# Patient Record
Sex: FEMALE | Race: BLACK OR AFRICAN AMERICAN | NOT HISPANIC OR LATINO | Employment: FULL TIME | URBAN - METROPOLITAN AREA
[De-identification: names, ages, dates, MRNs, and addresses within clinical notes are randomized per-mention and may not be internally consistent; named-entity substitution may affect disease eponyms.]

---

## 2023-08-16 PROBLEM — B37.31 CANDIDA VAGINITIS: Status: RESOLVED | Noted: 2023-08-16 | Resolved: 2023-08-16

## 2023-08-16 PROBLEM — L25.9 CONTACT DERMATITIS: Status: RESOLVED | Noted: 2023-08-16 | Resolved: 2023-08-16

## 2023-08-16 PROBLEM — L43.9 LICHEN PLANUS: Status: ACTIVE | Noted: 2023-08-16

## 2023-08-16 PROBLEM — E55.9 VITAMIN D DEFICIENCY: Status: ACTIVE | Noted: 2023-08-16

## 2023-08-16 PROBLEM — F70 MILD INTELLECTUAL DISABILITY: Status: ACTIVE | Noted: 2023-08-16

## 2023-08-16 PROBLEM — E87.6 HYPOKALEMIA: Status: ACTIVE | Noted: 2023-08-16

## 2023-08-16 PROBLEM — E66.9 OBESITY: Status: RESOLVED | Noted: 2023-08-16 | Resolved: 2023-08-16

## 2023-08-16 PROBLEM — O21.9 NAUSEA AND VOMITING IN PREGNANCY (HHS-HCC): Status: RESOLVED | Noted: 2023-08-16 | Resolved: 2023-08-16

## 2023-08-16 PROBLEM — O23.02 PYELONEPHRITIS AFFECTING PREGNANCY IN SECOND TRIMESTER (HHS-HCC): Status: RESOLVED | Noted: 2023-08-16 | Resolved: 2023-08-16

## 2023-08-16 PROBLEM — R63.5 ABNORMAL WEIGHT GAIN: Status: RESOLVED | Noted: 2023-08-16 | Resolved: 2023-08-16

## 2023-08-16 PROBLEM — L70.9 ACNE: Status: ACTIVE | Noted: 2023-08-16

## 2023-08-16 RX ORDER — B-COMPLEX WITH VITAMIN C
1 TABLET ORAL DAILY
COMMUNITY
Start: 2022-03-02 | End: 2024-01-12 | Stop reason: ALTCHOICE

## 2023-08-16 RX ORDER — POTASSIUM CHLORIDE 1500 MG/1
1 TABLET, EXTENDED RELEASE ORAL DAILY
COMMUNITY
Start: 2022-07-11

## 2023-08-16 RX ORDER — ERGOCALCIFEROL 1.25 MG/1
1 CAPSULE ORAL WEEKLY
COMMUNITY
Start: 2021-10-22 | End: 2023-08-19 | Stop reason: SDUPTHER

## 2023-08-17 ENCOUNTER — OFFICE VISIT (OUTPATIENT)
Dept: PRIMARY CARE | Facility: CLINIC | Age: 23
End: 2023-08-17
Payer: COMMERCIAL

## 2023-08-17 VITALS
BODY MASS INDEX: 32.58 KG/M2 | HEIGHT: 68 IN | SYSTOLIC BLOOD PRESSURE: 106 MMHG | WEIGHT: 215 LBS | HEART RATE: 72 BPM | DIASTOLIC BLOOD PRESSURE: 72 MMHG

## 2023-08-17 DIAGNOSIS — F70 MILD INTELLECTUAL DISABILITY: ICD-10-CM

## 2023-08-17 DIAGNOSIS — E55.9 VITAMIN D DEFICIENCY: ICD-10-CM

## 2023-08-17 DIAGNOSIS — B35.1 ONYCHOMYCOSIS: Primary | ICD-10-CM

## 2023-08-17 PROCEDURE — 1036F TOBACCO NON-USER: CPT | Performed by: FAMILY MEDICINE

## 2023-08-17 PROCEDURE — 99214 OFFICE O/P EST MOD 30 MIN: CPT | Performed by: FAMILY MEDICINE

## 2023-08-17 RX ORDER — TERBINAFINE HYDROCHLORIDE 250 MG/1
250 TABLET ORAL DAILY
Qty: 90 TABLET | Refills: 0 | Status: SHIPPED | OUTPATIENT
Start: 2023-08-17 | End: 2023-11-09 | Stop reason: SDUPTHER

## 2023-08-17 ASSESSMENT — PATIENT HEALTH QUESTIONNAIRE - PHQ9
1. LITTLE INTEREST OR PLEASURE IN DOING THINGS: NOT AT ALL
2. FEELING DOWN, DEPRESSED OR HOPELESS: NOT AT ALL
SUM OF ALL RESPONSES TO PHQ9 QUESTIONS 1 AND 2: 0

## 2023-08-17 ASSESSMENT — ENCOUNTER SYMPTOMS
LOSS OF SENSATION IN FEET: 0
DEPRESSION: 0
OCCASIONAL FEELINGS OF UNSTEADINESS: 0

## 2023-08-19 PROBLEM — B35.1 ONYCHOMYCOSIS: Status: ACTIVE | Noted: 2023-08-19

## 2023-08-19 RX ORDER — ERGOCALCIFEROL 1.25 MG/1
1 CAPSULE ORAL
Qty: 12 CAPSULE | Refills: 3 | Status: SHIPPED | OUTPATIENT
Start: 2023-08-19 | End: 2023-11-09 | Stop reason: SDUPTHER

## 2023-08-19 NOTE — PROGRESS NOTES
"Assessment and Plan:  Problem List Items Addressed This Visit       Mild intellectual disability    Vitamin D deficiency    Relevant Medications    ergocalciferol (Vitamin D-2) 1.25 MG (27849 UT) capsule    Onychomycosis - Primary    Relevant Medications    terbinafine (LamISIL) 250 mg tablet         HPI: has toe nail fungus on all toes for 2 years agetting worse   Needs RF vit D        ROS   Constitutional:  o weight loss. Negative for chills and fever.   HENT: Negative.     Respiratory: Negative.     Cardiovascular: Negative.    Gastrointestinal: Negative.  Negative for nausea and vomiting.   Endocrine: Negative.    Genitourinary: Negative.    Musculoskeletal: Negative.    Skin: Negative.  Negative for rash.   Allergic/Immunologic: Negative.    Neurological: Negative.    Hematological: Negative.    Psychiatric/Behavioral: Negative.     All other systems reviewed and are negative.      Blood Pressure 106/72   Pulse 72   Height 1.727 m (5' 8\")   Weight 97.5 kg (215 lb)   Body Mass Index 32.69 kg/m²   Body mass index is 32.69 kg/m².  Physical Exam    ENT:      Head: Normocephalic and atraumatic.      Right Ear: Tympanic membrane normal.      Left Ear: Tympanic membrane normal.      Nose: Nose normal.      Mouth/Throat:      Mouth: Mucous membranes are moist.   Eyes:      Pupils: Pupils are equal, round, and reactive to light.   Cardiovascular:      Rate and Rhythm: Normal rate and regular rhythm.      Pulses: Normal pulses.      Heart sounds: Normal heart sounds.   Pulmonary:      Effort: Pulmonary effort is normal.      Breath sounds: Normal breath sounds.   Abdominal:      General: Abdomen is flat. Bowel sounds are normal.      Palpations: Abdomen is soft.   Musculoskeletal:         General: Normal range of motion.      Cervical back: Normal range of motion and neck supple.   Skin:     General: Skin is warm and dry.      Capillary Refill: Capillary refill takes less than 2 seconds.   Neurological:      General: " No focal deficit present.      Mental Status: She is alert and oriented to person, place, and time.   Psychiatric:         Mood and Affect: Mood normal.       Active Problem List  Patient Active Problem List   Diagnosis    Acne    Hypokalemia    Lichen planus    Mild intellectual disability    Vitamin D deficiency    Onychomycosis       Comprehensive Medical/Surgical/Social/Family History  Past Medical History:   Diagnosis Date    Abnormal weight gain 08/16/2023    Contact dermatitis 08/16/2023    Encounter for contraceptive management, unspecified 01/20/2020    Contraception management    Encounter for other general counseling and advice on contraception 11/18/2017    Counseling for birth control, oral contraceptives    Obesity 08/16/2023    Pyelonephritis affecting pregnancy in second trimester 08/16/2023     No past surgical history on file.  Social History     Social History Narrative    Not on file       Allergies and Medications  Patient has no known allergies.  Current Outpatient Medications   Medication Instructions    ergocalciferol (VITAMIN D-2) 1,250 mcg, oral, Weekly    potassium chloride CR (K-Tab) 20 mEq ER tablet 1 tablet, oral, Daily    prenatal vitamin iron-folic (Prenatal Vitamin) 27 mg iron- 800 mcg tablet 1 tablet, oral, Daily    terbinafine (LAMISIL) 250 mg, oral, Daily

## 2023-08-29 RX ORDER — IBUPROFEN 600 MG/1
TABLET ORAL
COMMUNITY
Start: 2022-09-25

## 2023-08-29 RX ORDER — PRENATAL WITH FERROUS FUM AND FOLIC ACID 3080; 920; 120; 400; 22; 1.84; 3; 20; 10; 1; 12; 200; 27; 25; 2 [IU]/1; [IU]/1; MG/1; [IU]/1; MG/1; MG/1; MG/1; MG/1; MG/1; MG/1; UG/1; MG/1; MG/1; MG/1; MG/1
1 TABLET ORAL
COMMUNITY
Start: 2022-12-06 | End: 2024-01-12 | Stop reason: ALTCHOICE

## 2023-10-05 ENCOUNTER — APPOINTMENT (OUTPATIENT)
Dept: OBSTETRICS AND GYNECOLOGY | Facility: CLINIC | Age: 23
End: 2023-10-05
Payer: COMMERCIAL

## 2023-10-27 ENCOUNTER — APPOINTMENT (OUTPATIENT)
Dept: OBSTETRICS AND GYNECOLOGY | Facility: CLINIC | Age: 23
End: 2023-10-27
Payer: COMMERCIAL

## 2023-10-27 RX ORDER — MEDROXYPROGESTERONE ACETATE 150 MG/ML
150 INJECTION, SUSPENSION INTRAMUSCULAR ONCE
Status: CANCELLED | OUTPATIENT
Start: 2023-10-27 | End: 2023-10-27

## 2023-10-27 RX ORDER — AMOXICILLIN 500 MG/1
500 CAPSULE ORAL 3 TIMES DAILY
COMMUNITY
Start: 2023-09-21 | End: 2023-11-09 | Stop reason: ALTCHOICE

## 2023-11-09 ENCOUNTER — OFFICE VISIT (OUTPATIENT)
Dept: PRIMARY CARE | Facility: CLINIC | Age: 23
End: 2023-11-09
Payer: COMMERCIAL

## 2023-11-09 VITALS
HEIGHT: 68 IN | DIASTOLIC BLOOD PRESSURE: 72 MMHG | BODY MASS INDEX: 33.04 KG/M2 | SYSTOLIC BLOOD PRESSURE: 110 MMHG | WEIGHT: 218 LBS | HEART RATE: 91 BPM

## 2023-11-09 DIAGNOSIS — B35.1 ONYCHOMYCOSIS: ICD-10-CM

## 2023-11-09 DIAGNOSIS — E55.9 VITAMIN D DEFICIENCY: ICD-10-CM

## 2023-11-09 LAB
ALBUMIN SERPL BCP-MCNC: 4.5 G/DL (ref 3.4–5)
ALP SERPL-CCNC: 74 U/L (ref 33–110)
ALT SERPL W P-5'-P-CCNC: 16 U/L (ref 7–45)
AST SERPL W P-5'-P-CCNC: 16 U/L (ref 9–39)
BILIRUB DIRECT SERPL-MCNC: 0.1 MG/DL (ref 0–0.3)
BILIRUB SERPL-MCNC: 0.4 MG/DL (ref 0–1.2)
PROT SERPL-MCNC: 7.7 G/DL (ref 6.4–8.2)

## 2023-11-09 PROCEDURE — 80076 HEPATIC FUNCTION PANEL: CPT

## 2023-11-09 PROCEDURE — 36415 COLL VENOUS BLD VENIPUNCTURE: CPT

## 2023-11-09 PROCEDURE — 99214 OFFICE O/P EST MOD 30 MIN: CPT | Performed by: FAMILY MEDICINE

## 2023-11-09 PROCEDURE — 1036F TOBACCO NON-USER: CPT | Performed by: FAMILY MEDICINE

## 2023-11-09 RX ORDER — ERGOCALCIFEROL 1.25 MG/1
1 CAPSULE ORAL
Qty: 12 CAPSULE | Refills: 3 | Status: SHIPPED | OUTPATIENT
Start: 2023-11-09 | End: 2024-01-15 | Stop reason: SDUPTHER

## 2023-11-09 RX ORDER — TERBINAFINE HYDROCHLORIDE 250 MG/1
250 TABLET ORAL DAILY
Qty: 90 TABLET | Refills: 0 | Status: SHIPPED | OUTPATIENT
Start: 2023-11-09 | End: 2024-01-12 | Stop reason: SDUPTHER

## 2023-11-09 NOTE — PROGRESS NOTES
Chief Complaint/HPI:  Patient for follow-up needs to refill vitamin D and terbinafine toenail fungus is getting better denies any other complaints      ROS otherwise negative aside from what was mentioned above in HPI.      Patient Active Problem List   Diagnosis    Acne    Hypokalemia    Lichen planus    Mild intellectual disability    Vitamin D deficiency    Onychomycosis     Past Medical History:   Diagnosis Date    Abnormal weight gain 08/16/2023    Contact dermatitis 08/16/2023    Encounter for contraceptive management, unspecified 01/20/2020    Contraception management    Encounter for other general counseling and advice on contraception 11/18/2017    Counseling for birth control, oral contraceptives    Obesity 08/16/2023    Pyelonephritis affecting pregnancy in second trimester 08/16/2023     History reviewed. No pertinent surgical history.  Family History   Problem Relation Name Age of Onset    No Known Problems Mother      No Known Problems Father       Social History     Tobacco Use    Smoking status: Never    Smokeless tobacco: Never   Substance Use Topics    Alcohol use: Never    Drug use: Never         ALLERGIES  No Known Allergies      MEDICATIONS  Current Outpatient Medications   Medication Sig Dispense Refill    ibuprofen 600 mg tablet       potassium chloride CR (K-Tab) 20 mEq ER tablet Take 1 tablet (20 mEq) by mouth once daily.      prenatal vitamin iron-folic (Prenatal Vitamin) 27 mg iron- 800 mcg tablet Take 1 tablet by mouth once daily.      Prenatal Vitamin Plus Low Iron 27 mg iron- 1 mg tablet Take 1 tablet by mouth once daily with a meal.      ergocalciferol (Vitamin D-2) 1.25 MG (76587 UT) capsule Take 1 capsule (1,250 mcg) by mouth 1 (one) time per week. 12 capsule 3    terbinafine (LamISIL) 250 mg tablet Take 1 tablet (250 mg) by mouth once daily. 90 tablet 0     No current facility-administered medications for this visit.         PHYSICAL EXAM  Visit Vitals  Blood Pressure 110/72   Pulse  91     .FLOWAMB[11   .FLOWAMB[14   Body mass index is 33.15 kg/m².  Gen: Alert, NAD  HEENT:  PERRLA, EOMI, conjunctiva and sclera normal in appearance  Respiratory:  Lungs CTAB  Cardiovascular:  Heart RRR. No M/R/G  Neuro:  Gross motor and sensory intact  Skin:  No suspicious lesions present    ASSESSMENT/PLAN  Problem List Items Addressed This Visit       Vitamin D deficiency    Relevant Medications    ergocalciferol (Vitamin D-2) 1.25 MG (28626 UT) capsule    Onychomycosis    Relevant Medications    terbinafine (LamISIL) 250 mg tablet    Other Relevant Orders    Hepatic function panel (Completed)           Anders Marquez MD

## 2023-11-20 ENCOUNTER — APPOINTMENT (OUTPATIENT)
Dept: OBSTETRICS AND GYNECOLOGY | Facility: CLINIC | Age: 23
End: 2023-11-20
Payer: COMMERCIAL

## 2023-11-20 ENCOUNTER — APPOINTMENT (OUTPATIENT)
Dept: PRIMARY CARE | Facility: CLINIC | Age: 23
End: 2023-11-20
Payer: COMMERCIAL

## 2023-12-11 ENCOUNTER — APPOINTMENT (OUTPATIENT)
Dept: OBSTETRICS AND GYNECOLOGY | Facility: CLINIC | Age: 23
End: 2023-12-11
Payer: COMMERCIAL

## 2024-01-08 ENCOUNTER — APPOINTMENT (OUTPATIENT)
Dept: OBSTETRICS AND GYNECOLOGY | Facility: CLINIC | Age: 24
End: 2024-01-08
Payer: COMMERCIAL

## 2024-01-12 ENCOUNTER — OFFICE VISIT (OUTPATIENT)
Dept: PRIMARY CARE | Facility: CLINIC | Age: 24
End: 2024-01-12
Payer: COMMERCIAL

## 2024-01-12 VITALS
HEIGHT: 68 IN | DIASTOLIC BLOOD PRESSURE: 82 MMHG | SYSTOLIC BLOOD PRESSURE: 116 MMHG | BODY MASS INDEX: 33.95 KG/M2 | HEART RATE: 98 BPM | WEIGHT: 224 LBS

## 2024-01-12 DIAGNOSIS — R09.81 NASAL CONGESTION: ICD-10-CM

## 2024-01-12 DIAGNOSIS — B35.1 ONYCHOMYCOSIS: ICD-10-CM

## 2024-01-12 DIAGNOSIS — E55.9 VITAMIN D DEFICIENCY: Primary | ICD-10-CM

## 2024-01-12 DIAGNOSIS — Z00.00 ROUTINE GENERAL MEDICAL EXAMINATION AT A HEALTH CARE FACILITY: ICD-10-CM

## 2024-01-12 LAB
25(OH)D3 SERPL-MCNC: 25 NG/ML (ref 30–100)
ALBUMIN SERPL BCP-MCNC: 4.6 G/DL (ref 3.4–5)
ALP SERPL-CCNC: 69 U/L (ref 33–110)
ALT SERPL W P-5'-P-CCNC: 19 U/L (ref 7–45)
ANION GAP SERPL CALC-SCNC: 13 MMOL/L (ref 10–20)
AST SERPL W P-5'-P-CCNC: 22 U/L (ref 9–39)
BILIRUB SERPL-MCNC: 0.4 MG/DL (ref 0–1.2)
BUN SERPL-MCNC: 8 MG/DL (ref 6–23)
CALCIUM SERPL-MCNC: 9.8 MG/DL (ref 8.6–10.6)
CHLORIDE SERPL-SCNC: 103 MMOL/L (ref 98–107)
CHOLEST SERPL-MCNC: 167 MG/DL (ref 0–199)
CHOLESTEROL/HDL RATIO: 2.8
CO2 SERPL-SCNC: 26 MMOL/L (ref 21–32)
CREAT SERPL-MCNC: 0.76 MG/DL (ref 0.5–1.05)
EGFRCR SERPLBLD CKD-EPI 2021: >90 ML/MIN/1.73M*2
ERYTHROCYTE [DISTWIDTH] IN BLOOD BY AUTOMATED COUNT: 12.9 % (ref 11.5–14.5)
GLUCOSE SERPL-MCNC: 82 MG/DL (ref 74–99)
HCT VFR BLD AUTO: 44.4 % (ref 36–46)
HDLC SERPL-MCNC: 58.8 MG/DL
HGB BLD-MCNC: 13.9 G/DL (ref 12–16)
LDLC SERPL CALC-MCNC: 94 MG/DL
MCH RBC QN AUTO: 26.9 PG (ref 26–34)
MCHC RBC AUTO-ENTMCNC: 31.3 G/DL (ref 32–36)
MCV RBC AUTO: 86 FL (ref 80–100)
NON HDL CHOLESTEROL: 108 MG/DL (ref 0–149)
NRBC BLD-RTO: 0 /100 WBCS (ref 0–0)
PLATELET # BLD AUTO: 268 X10*3/UL (ref 150–450)
POTASSIUM SERPL-SCNC: 4.3 MMOL/L (ref 3.5–5.3)
PROT SERPL-MCNC: 8 G/DL (ref 6.4–8.2)
RBC # BLD AUTO: 5.16 X10*6/UL (ref 4–5.2)
SODIUM SERPL-SCNC: 138 MMOL/L (ref 136–145)
TRIGL SERPL-MCNC: 69 MG/DL (ref 0–149)
TSH SERPL-ACNC: 0.58 MIU/L (ref 0.44–3.98)
VIT B12 SERPL-MCNC: 583 PG/ML (ref 211–911)
VLDL: 14 MG/DL (ref 0–40)
WBC # BLD AUTO: 9.4 X10*3/UL (ref 4.4–11.3)

## 2024-01-12 PROCEDURE — 99214 OFFICE O/P EST MOD 30 MIN: CPT | Performed by: FAMILY MEDICINE

## 2024-01-12 PROCEDURE — 80061 LIPID PANEL: CPT

## 2024-01-12 PROCEDURE — 1036F TOBACCO NON-USER: CPT | Performed by: FAMILY MEDICINE

## 2024-01-12 PROCEDURE — 80053 COMPREHEN METABOLIC PANEL: CPT

## 2024-01-12 PROCEDURE — 85027 COMPLETE CBC AUTOMATED: CPT

## 2024-01-12 PROCEDURE — 99395 PREV VISIT EST AGE 18-39: CPT | Performed by: FAMILY MEDICINE

## 2024-01-12 PROCEDURE — 82306 VITAMIN D 25 HYDROXY: CPT

## 2024-01-12 PROCEDURE — 87636 SARSCOV2 & INF A&B AMP PRB: CPT

## 2024-01-12 PROCEDURE — 82607 VITAMIN B-12: CPT

## 2024-01-12 PROCEDURE — 36415 COLL VENOUS BLD VENIPUNCTURE: CPT

## 2024-01-12 PROCEDURE — 84443 ASSAY THYROID STIM HORMONE: CPT

## 2024-01-12 RX ORDER — TERBINAFINE HYDROCHLORIDE 250 MG/1
250 TABLET ORAL DAILY
Qty: 90 TABLET | Refills: 0 | Status: SHIPPED | OUTPATIENT
Start: 2024-01-12 | End: 2024-04-13 | Stop reason: SDUPTHER

## 2024-01-12 ASSESSMENT — PATIENT HEALTH QUESTIONNAIRE - PHQ9
2. FEELING DOWN, DEPRESSED OR HOPELESS: NOT AT ALL
SUM OF ALL RESPONSES TO PHQ9 QUESTIONS 1 AND 2: 0
1. LITTLE INTEREST OR PLEASURE IN DOING THINGS: NOT AT ALL

## 2024-01-12 ASSESSMENT — ENCOUNTER SYMPTOMS
OCCASIONAL FEELINGS OF UNSTEADINESS: 0
LOSS OF SENSATION IN FEET: 0
DEPRESSION: 0

## 2024-01-12 NOTE — PROGRESS NOTES
Chief Complaint/HPI:  Patient for follow-up needs to refill vitamin D and terbinafine toenail fungus is getting better     Denies cough congestion no fever      ROS otherwise negative aside from what was mentioned above in HPI.      Patient Active Problem List   Diagnosis    Acne    Hypokalemia    Lichen planus    Mild intellectual disability    Vitamin D deficiency    Onychomycosis    Routine general medical examination at a health care facility    Nasal congestion     Past Medical History:   Diagnosis Date    Abnormal weight gain 08/16/2023    Contact dermatitis 08/16/2023    Encounter for contraceptive management, unspecified 01/20/2020    Contraception management    Encounter for other general counseling and advice on contraception 11/18/2017    Counseling for birth control, oral contraceptives    Obesity 08/16/2023    Pyelonephritis affecting pregnancy in second trimester 08/16/2023     No past surgical history on file.  Family History   Problem Relation Name Age of Onset    No Known Problems Mother      No Known Problems Father       Social History     Tobacco Use    Smoking status: Never    Smokeless tobacco: Never   Substance Use Topics    Alcohol use: Never    Drug use: Never         ALLERGIES  No Known Allergies      MEDICATIONS  Current Outpatient Medications   Medication Sig Dispense Refill    ergocalciferol (Vitamin D-2) 1.25 MG (53020 UT) capsule Take 1 capsule (1,250 mcg) by mouth 1 (one) time per week. 12 capsule 3    ibuprofen 600 mg tablet       potassium chloride CR (K-Tab) 20 mEq ER tablet Take 1 tablet (20 mEq) by mouth once daily.      terbinafine (LamISIL) 250 mg tablet Take 1 tablet (250 mg) by mouth once daily. 90 tablet 0     No current facility-administered medications for this visit.         PHYSICAL EXAM  Visit Vitals  Blood Pressure 116/82   Pulse 98     Body mass index is 34.06 kg/m².  Gen: Alert, NAD  HEENT:  PERRLA, EOMI, conjunctiva and sclera normal in appearance  Respiratory:   Lungs CTAB  Cardiovascular:  Heart RRR. No M/R/G  Neuro:  Gross motor and sensory intact  Skin:  No suspicious lesions present    ASSESSMENT/PLAN  Problem List Items Addressed This Visit       Vitamin D deficiency - Primary    Relevant Orders    Vitamin D 25-Hydroxy,Total (for eval of Vitamin D levels)    Vitamin B12    Onychomycosis    Relevant Medications    terbinafine (LamISIL) 250 mg tablet    Other Relevant Orders    Comprehensive Metabolic Panel    Routine general medical examination at a health care facility    Current Assessment & Plan     Get Prevnar and Zostavax.          Relevant Orders    TSH with reflex to Free T4 if abnormal    Lipid Panel    Comprehensive Metabolic Panel    CBC    Vitamin B12    Nasal congestion    Relevant Orders    Sars-CoV-2 and Influenza A/B PCR           Anders Marquez MD

## 2024-01-13 LAB
FLUAV RNA RESP QL NAA+PROBE: NOT DETECTED
FLUBV RNA RESP QL NAA+PROBE: NOT DETECTED
SARS-COV-2 RNA RESP QL NAA+PROBE: NOT DETECTED

## 2024-01-15 DIAGNOSIS — E55.9 VITAMIN D DEFICIENCY: ICD-10-CM

## 2024-01-15 RX ORDER — ERGOCALCIFEROL 1.25 MG/1
1 CAPSULE ORAL
Qty: 13 CAPSULE | Refills: 3 | Status: SHIPPED | OUTPATIENT
Start: 2024-01-15 | End: 2024-04-12 | Stop reason: SDUPTHER

## 2024-02-15 ENCOUNTER — APPOINTMENT (OUTPATIENT)
Dept: PRIMARY CARE | Facility: CLINIC | Age: 24
End: 2024-02-15
Payer: COMMERCIAL

## 2024-02-19 ENCOUNTER — APPOINTMENT (OUTPATIENT)
Dept: OBSTETRICS AND GYNECOLOGY | Facility: CLINIC | Age: 24
End: 2024-02-19
Payer: COMMERCIAL

## 2024-03-13 ENCOUNTER — APPOINTMENT (OUTPATIENT)
Dept: DERMATOLOGY | Facility: CLINIC | Age: 24
End: 2024-03-13
Payer: COMMERCIAL

## 2024-03-18 ENCOUNTER — APPOINTMENT (OUTPATIENT)
Dept: OBSTETRICS AND GYNECOLOGY | Facility: CLINIC | Age: 24
End: 2024-03-18
Payer: COMMERCIAL

## 2024-04-12 ENCOUNTER — OFFICE VISIT (OUTPATIENT)
Dept: PRIMARY CARE | Facility: CLINIC | Age: 24
End: 2024-04-12
Payer: COMMERCIAL

## 2024-04-12 VITALS
OXYGEN SATURATION: 97 % | HEART RATE: 91 BPM | SYSTOLIC BLOOD PRESSURE: 116 MMHG | RESPIRATION RATE: 18 BRPM | BODY MASS INDEX: 34.09 KG/M2 | DIASTOLIC BLOOD PRESSURE: 81 MMHG | WEIGHT: 224.2 LBS

## 2024-04-12 DIAGNOSIS — E55.9 VITAMIN D DEFICIENCY: ICD-10-CM

## 2024-04-12 DIAGNOSIS — E66.01 CLASS 2 SEVERE OBESITY WITH SERIOUS COMORBIDITY AND BODY MASS INDEX (BMI) OF 35.0 TO 35.9 IN ADULT, UNSPECIFIED OBESITY TYPE (MULTI): ICD-10-CM

## 2024-04-12 DIAGNOSIS — B35.1 ONYCHOMYCOSIS: Primary | ICD-10-CM

## 2024-04-12 PROCEDURE — 99214 OFFICE O/P EST MOD 30 MIN: CPT | Performed by: FAMILY MEDICINE

## 2024-04-12 PROCEDURE — 3008F BODY MASS INDEX DOCD: CPT | Performed by: FAMILY MEDICINE

## 2024-04-12 RX ORDER — ERGOCALCIFEROL 1.25 MG/1
1 CAPSULE ORAL
Qty: 13 CAPSULE | Refills: 0 | Status: SHIPPED | OUTPATIENT
Start: 2024-04-14 | End: 2025-04-14

## 2024-04-12 NOTE — PROGRESS NOTES
Subjective   Patient ID: Renetta Rodriguez is a 24 y.o. female who presents for Obesity (Vitamin d/Hypokalemia/).  Toenail fungus      HPI  Patient is here for follow-up with he has been taking vitamin D has been feeling better has had a hard time losing weight  Has been taking medication toenail is getting better very slowly  Would like to see a nutritionist  Current Outpatient Medications on File Prior to Visit   Medication Sig Dispense Refill    ibuprofen 600 mg tablet       potassium chloride CR (K-Tab) 20 mEq ER tablet Take 1 tablet (20 mEq) by mouth once daily.      [DISCONTINUED] ergocalciferol (Vitamin D-2) 1.25 MG (15234 UT) capsule Take 1 capsule (1,250 mcg) by mouth 1 (one) time per week. (Patient not taking: Reported on 4/12/2024) 13 capsule 3    [DISCONTINUED] terbinafine (LamISIL) 250 mg tablet Take 1 tablet (250 mg) by mouth once daily. 90 tablet 0     No current facility-administered medications on file prior to visit.        Review of Systems   Constitutional:  Positive for unexpected weight change. Negative for chills and fever.   HENT: Negative.     Respiratory: Negative.     Cardiovascular: Negative.    Gastrointestinal: Negative.  Negative for nausea and vomiting.   Endocrine: Negative.    Genitourinary: Negative.    Musculoskeletal: Negative.    Skin: Negative.  Negative for rash.   Allergic/Immunologic: Negative.    Neurological: Negative.    Hematological: Negative.    Psychiatric/Behavioral: Negative.     All other systems reviewed and are negative.      Objective   /81   Pulse 91   Resp 18   Wt 102 kg (224 lb 3.2 oz)   SpO2 97%   BMI 34.09 kg/m²   BSA: 2.21 meters squared  Growth percentiles: Facility age limit for growth %morris is 20 years. Facility age limit for growth %morris is 20 years.   No visits with results within 1 Week(s) from this visit.   Latest known visit with results is:   Office Visit on 01/12/2024   Component Date Value Ref Range Status    Vitamin D, 25-Hydroxy, Total  01/12/2024 25 (L)  30 - 100 ng/mL Final    Thyroid Stimulating Hormone 01/12/2024 0.58  0.44 - 3.98 mIU/L Final    Cholesterol 01/12/2024 167  0 - 199 mg/dL Final          Age      Desirable   Borderline High   High     0-19 Y     0 - 169       170 - 199     >/= 200    20-24 Y     0 - 189       190 - 224     >/= 225         >24 Y     0 - 199       200 - 239     >/= 240   **All ranges are based on fasting samples. Specific   therapeutic targets will vary based on patient-specific   cardiac risk.    Pediatric guidelines reference:Pediatrics 2011, 128(S5).Adult guidelines reference: NCEP ATPIII Guidelines,DONA 2001, 258:2486-97    Venipuncture immediately after or during the administration of Metamizole may lead to falsely low results. Testing should be performed immediately prior to Metamizole dosing.    HDL-Cholesterol 01/12/2024 58.8  mg/dL Final      Age       Very Low   Low     Normal    High    0-19 Y    < 35      < 40     40-45     ----  20-24 Y    ----     < 40      >45      ----        >24 Y      ----     < 40     40-60      >60      Cholesterol/HDL Ratio 01/12/2024 2.8   Final      Ref Values  Desirable  < 3.4  High Risk  > 5.0    LDL Calculated 01/12/2024 94  <=119 mg/dL Final                                Near   Borderline      AGE      Desirable  Optimal    High     High     Very High     0-19 Y     0 - 109     ---    110-129   >/= 130     ----    20-24 Y     0 - 119     ---    120-159   >/= 160     ----      >24 Y     0 -  99   100-129  130-159   160-189     >/=190      VLDL 01/12/2024 14  0 - 40 mg/dL Final    Triglycerides 01/12/2024 69  0 - 149 mg/dL Final       Age         Desirable   Borderline High   High     Very High   0 D-90 D    19 - 174         ----         ----        ----  91 D- 9 Y     0 -  74        75 -  99     >/= 100      ----    10-19 Y     0 -  89        90 - 129     >/= 130      ----    20-24 Y     0 - 114       115 - 149     >/= 150      ----         >24 Y     0 - 149       150 -  199    200- 499    >/= 500    Venipuncture immediately after or during the administration of Metamizole may lead to falsely low results. Testing should be performed immediately prior to Metamizole dosing.    Non HDL Cholesterol 01/12/2024 108  0 - 149 mg/dL Final          Age       Desirable   Borderline High   High     Very High     0-19 Y     0 - 119       120 - 144     >/= 145    >/= 160    20-24 Y     0 - 149       150 - 189     >/= 190      ----         >24 Y    30 mg/dL above LDL Cholesterol goal      Glucose 01/12/2024 82  74 - 99 mg/dL Final    Sodium 01/12/2024 138  136 - 145 mmol/L Final    Potassium 01/12/2024 4.3  3.5 - 5.3 mmol/L Final    Chloride 01/12/2024 103  98 - 107 mmol/L Final    Bicarbonate 01/12/2024 26  21 - 32 mmol/L Final    Anion Gap 01/12/2024 13  10 - 20 mmol/L Final    Urea Nitrogen 01/12/2024 8  6 - 23 mg/dL Final    Creatinine 01/12/2024 0.76  0.50 - 1.05 mg/dL Final    eGFR 01/12/2024 >90  >60 mL/min/1.73m*2 Final    Calculations of estimated GFR are performed using the 2021 CKD-EPI Study Refit equation without the race variable for the IDMS-Traceable creatinine methods.  https://jasn.asnjournals.org/content/early/2021/09/22/ASN.8511801291    Calcium 01/12/2024 9.8  8.6 - 10.6 mg/dL Final    Albumin 01/12/2024 4.6  3.4 - 5.0 g/dL Final    Alkaline Phosphatase 01/12/2024 69  33 - 110 U/L Final    Total Protein 01/12/2024 8.0  6.4 - 8.2 g/dL Final    AST 01/12/2024 22  9 - 39 U/L Final    Bilirubin, Total 01/12/2024 0.4  0.0 - 1.2 mg/dL Final    ALT 01/12/2024 19  7 - 45 U/L Final    Patients treated with Sulfasalazine may generate falsely decreased results for ALT.    WBC 01/12/2024 9.4  4.4 - 11.3 x10*3/uL Final    nRBC 01/12/2024 0.0  0.0 - 0.0 /100 WBCs Final    RBC 01/12/2024 5.16  4.00 - 5.20 x10*6/uL Final    Hemoglobin 01/12/2024 13.9  12.0 - 16.0 g/dL Final    Hematocrit 01/12/2024 44.4  36.0 - 46.0 % Final    MCV 01/12/2024 86  80 - 100 fL Final    MCH 01/12/2024 26.9  26.0 -  34.0 pg Final    MCHC 01/12/2024 31.3 (L)  32.0 - 36.0 g/dL Final    RDW 01/12/2024 12.9  11.5 - 14.5 % Final    Platelets 01/12/2024 268  150 - 450 x10*3/uL Final    Vitamin B12 01/12/2024 583  211 - 911 pg/mL Final    Flu A Result 01/12/2024 Not Detected  Not Detected Final    Flu B Result 01/12/2024 Not Detected  Not Detected Final    Coronavirus 2019, PCR 01/12/2024 Not Detected  Not Detected Final      Physical Exam  Constitutional:       General: She is not in acute distress.  Eyes:      Extraocular Movements: Extraocular movements intact.   Cardiovascular:      Rate and Rhythm: Normal rate and regular rhythm.   Pulmonary:      Breath sounds: Normal breath sounds.   Abdominal:      General: Bowel sounds are normal.   Musculoskeletal:         General: Normal range of motion.      Cervical back: No rigidity.   Skin:     Findings: No rash.   Neurological:      General: No focal deficit present.      Mental Status: She is alert.   Psychiatric:         Thought Content: Thought content normal.         Assessment/Plan   Problem List Items Addressed This Visit             ICD-10-CM    Vitamin D deficiency E55.9     Continue vitamin D medication was refilled         Relevant Medications    ergocalciferol (Vitamin D-2) 1.25 MG (61993 UT) capsule (Start on 4/14/2024)    Onychomycosis - Primary B35.1     Continue terbinafine         Relevant Medications    terbinafine (LamISIL) 250 mg tablet    Other Relevant Orders    Comprehensive metabolic panel    Class 2 severe obesity with serious comorbidity and body mass index (BMI) of 35.0 to 35.9 in adult (Multi) E66.01, Z68.35     Watch what you eat and include more vegetables on your plate.  Portion control and exercise will help in loosing weight .   It is recommended to get 150 mins of brisk exercise in a week . 150 mins of exercise per week will help in maintaining your current weight, if you are already working out . Exercise should make your heart rate go up.   Calorie  deficit ( spending more calories than eating ) will result in weight loss    A deficit of 500 calories per day in 7 days would results in 1lbs weight loss per week., Aim for 1-2 lbs weight loss per month.   You can reduce the calorie intake by 250 calories and spend 250 calories by working out which would give you a total deficit of 500 calories     No sugary/ sweetened beverages , portion control , dedicated physical activity atleast 5 times a week advised .            Relevant Orders    Referral to Nutrition Services     Follow up in 3 months for toe nail funcgus

## 2024-04-13 PROBLEM — E66.01 CLASS 2 SEVERE OBESITY WITH SERIOUS COMORBIDITY AND BODY MASS INDEX (BMI) OF 35.0 TO 35.9 IN ADULT (MULTI): Status: ACTIVE | Noted: 2024-04-13

## 2024-04-13 RX ORDER — TERBINAFINE HYDROCHLORIDE 250 MG/1
250 TABLET ORAL DAILY
Qty: 90 TABLET | Refills: 0 | Status: SHIPPED | OUTPATIENT
Start: 2024-04-13 | End: 2024-07-12

## 2024-04-13 ASSESSMENT — ENCOUNTER SYMPTOMS
PSYCHIATRIC NEGATIVE: 1
UNEXPECTED WEIGHT CHANGE: 1
NAUSEA: 0
VOMITING: 0
FEVER: 0
NEUROLOGICAL NEGATIVE: 1
ALLERGIC/IMMUNOLOGIC NEGATIVE: 1
GASTROINTESTINAL NEGATIVE: 1
CARDIOVASCULAR NEGATIVE: 1
MUSCULOSKELETAL NEGATIVE: 1
HEMATOLOGIC/LYMPHATIC NEGATIVE: 1
ENDOCRINE NEGATIVE: 1
CHILLS: 0
RESPIRATORY NEGATIVE: 1

## 2024-04-19 ENCOUNTER — APPOINTMENT (OUTPATIENT)
Dept: OBSTETRICS AND GYNECOLOGY | Facility: CLINIC | Age: 24
End: 2024-04-19
Payer: COMMERCIAL

## 2024-04-26 ENCOUNTER — TELEPHONE (OUTPATIENT)
Dept: OBSTETRICS AND GYNECOLOGY | Facility: CLINIC | Age: 24
End: 2024-04-26

## 2024-05-03 ENCOUNTER — PROCEDURE VISIT (OUTPATIENT)
Dept: OBSTETRICS AND GYNECOLOGY | Facility: CLINIC | Age: 24
End: 2024-05-03
Payer: COMMERCIAL

## 2024-05-03 VITALS
WEIGHT: 219 LBS | HEIGHT: 68 IN | SYSTOLIC BLOOD PRESSURE: 114 MMHG | DIASTOLIC BLOOD PRESSURE: 67 MMHG | BODY MASS INDEX: 33.19 KG/M2

## 2024-05-03 DIAGNOSIS — Z30.430 ENCOUNTER FOR IUD INSERTION: Primary | ICD-10-CM

## 2024-05-03 LAB — PREGNANCY TEST URINE, POC: NEGATIVE

## 2024-05-03 PROCEDURE — 87800 DETECT AGNT MULT DNA DIREC: CPT | Performed by: OBSTETRICS & GYNECOLOGY

## 2024-05-03 PROCEDURE — 2500000004 HC RX 250 GENERAL PHARMACY W/ HCPCS (ALT 636 FOR OP/ED): Performed by: OBSTETRICS & GYNECOLOGY

## 2024-05-03 PROCEDURE — 58300 INSERT INTRAUTERINE DEVICE: CPT | Performed by: OBSTETRICS & GYNECOLOGY

## 2024-05-03 PROCEDURE — 81025 URINE PREGNANCY TEST: CPT | Performed by: OBSTETRICS & GYNECOLOGY

## 2024-05-03 PROCEDURE — 87661 TRICHOMONAS VAGINALIS AMPLIF: CPT | Performed by: OBSTETRICS & GYNECOLOGY

## 2024-05-03 RX ADMIN — LEVONORGESTREL 52 MG: 52 INTRAUTERINE DEVICE INTRAUTERINE at 10:20

## 2024-05-03 ASSESSMENT — ENCOUNTER SYMPTOMS
PSYCHIATRIC NEGATIVE: 0
GASTROINTESTINAL NEGATIVE: 0
NEUROLOGICAL NEGATIVE: 0
MUSCULOSKELETAL NEGATIVE: 0
RESPIRATORY NEGATIVE: 0
ENDOCRINE NEGATIVE: 0
ALLERGIC/IMMUNOLOGIC NEGATIVE: 0
CONSTITUTIONAL NEGATIVE: 0
HEMATOLOGIC/LYMPHATIC NEGATIVE: 0
CARDIOVASCULAR NEGATIVE: 0
EYES NEGATIVE: 0

## 2024-05-03 ASSESSMENT — PAIN SCALES - GENERAL: PAINLEVEL: 0-NO PAIN

## 2024-05-03 NOTE — PROGRESS NOTES
Patient ID: Renetta Rodriguez is a 24 y.o. female.    IUD Insertion    Date/Time: 5/3/2024 10:17 AM    Performed by: Golden Simon MD  Authorized by: Golden Simon MD    Consent:     Consent obtained:  Written    Consent given by:  Patient    Procedure risks and benefits discussed: yes      Patient questions answered: yes      Patient agrees, verbalizes understanding, and wants to proceed: yes    Procedure:     Pelvic exam performed: yes      Negative GC/chlamydia test: no      Negative urine pregnancy test: yes      Cervix cleaned and prepped: yes      Speculum placed in vagina: yes      Tenaculum applied to cervix: yes      Uterus sounded: yes      Uterus sound depth (cm):  9    IUD inserted with no complications: yes      IUD type:  Mirena  Comments:      Cultures performed and result pending  Follow-up for annual exam and check IUD

## 2024-05-04 LAB
C TRACH RRNA SPEC QL NAA+PROBE: NEGATIVE
N GONORRHOEA DNA SPEC QL PROBE+SIG AMP: NEGATIVE
T VAGINALIS RRNA SPEC QL NAA+PROBE: NEGATIVE

## 2024-06-05 ENCOUNTER — OFFICE VISIT (OUTPATIENT)
Dept: DERMATOLOGY | Facility: CLINIC | Age: 24
End: 2024-06-05
Payer: COMMERCIAL

## 2024-06-05 DIAGNOSIS — R21 RASH AND OTHER NONSPECIFIC SKIN ERUPTION: Primary | ICD-10-CM

## 2024-06-05 PROCEDURE — 3008F BODY MASS INDEX DOCD: CPT | Performed by: STUDENT IN AN ORGANIZED HEALTH CARE EDUCATION/TRAINING PROGRAM

## 2024-06-05 PROCEDURE — 99203 OFFICE O/P NEW LOW 30 MIN: CPT | Performed by: STUDENT IN AN ORGANIZED HEALTH CARE EDUCATION/TRAINING PROGRAM

## 2024-06-05 ASSESSMENT — DERMATOLOGY PATIENT ASSESSMENT
DO YOU HAVE ANY NEW OR CHANGING LESIONS: NO
HAVE YOU HAD OR DO YOU HAVE VASCULAR DISEASE: NO
ARE YOU ON BIRTH CONTROL: YES
WHAT TYPE OF BIRTH CONTROL: IUD
ARE YOU TRYING TO GET PREGNANT: NO
DO YOU HAVE IRREGULAR MENSTRUAL CYCLES: NO
HAVE YOU HAD OR DO YOU HAVE A STAPH INFECTION: NO
DO YOU USE A TANNING BED: NO
ARE YOU AN ORGAN TRANSPLANT RECIPIENT: NO

## 2024-06-05 ASSESSMENT — ITCH NUMERIC RATING SCALE: HOW SEVERE IS YOUR ITCHING?: 2

## 2024-06-05 ASSESSMENT — DERMATOLOGY QUALITY OF LIFE (QOL) ASSESSMENT: ARE THERE EXCLUSIONS OR EXCEPTIONS FOR THE QUALITY OF LIFE ASSESSMENT: NO

## 2024-06-05 ASSESSMENT — PATIENT GLOBAL ASSESSMENT (PGA): PATIENT GLOBAL ASSESSMENT: PATIENT GLOBAL ASSESSMENT:  1 - CLEAR

## 2024-06-05 NOTE — PROGRESS NOTES
Subjective     Renetta Rodriguez is a 24 y.o. female who presents for the following: Rash (underarms). Rash has been ongoing for a couple years and can be very itchy to the point where she bleeds from scratching. She has tried several different deodorants. If she does not use the deodorant, the rash gets a bit better.    Review of Systems:  No other skin or systemic complaints other than what is documented elsewhere in the note.    The following portions of the chart were reviewed this encounter and updated as appropriate:          Skin Cancer History  No skin cancer on file.      Specialty Problems          Dermatology Problems    Acne    Lichen planus    Onychomycosis        Objective   Well appearing patient in no apparent distress; mood and affect are within normal limits.    A focused skin examination was performed. All findings within normal limits unless otherwise noted below.    Assessment/Plan   1. Rash and other nonspecific skin eruption  Left Axilla, Right Axilla  Involving bilateral axillae are hyperpigmented and mildly erythematous well-defined patches    - Rash of bilateral axillae that is very itchy, ongoing for several years  - Suspect irritant contact dermatitis from deodorants   - Recommend use of unscented deodorant  - Start tacrolimus 0.1% ointment BID, prescription sent to [facility]  - Follow up 3 months. If improved, plan to taper use of tacrolimus ointment    Related Procedures  Follow Up In Dermatology - Established Patient      Dulce Maira Zambrano MD    I was present during all key portions of visit including history, exam, discussion/plan and/or procedures and directly supervised our resident during all portions of the visit, follow up care, medications and more    Mono Khan MD

## 2024-09-11 ENCOUNTER — APPOINTMENT (OUTPATIENT)
Dept: DERMATOLOGY | Facility: CLINIC | Age: 24
End: 2024-09-11
Payer: COMMERCIAL

## 2024-10-03 ENCOUNTER — APPOINTMENT (OUTPATIENT)
Dept: OBSTETRICS AND GYNECOLOGY | Facility: CLINIC | Age: 24
End: 2024-10-03
Payer: COMMERCIAL